# Patient Record
Sex: MALE | NOT HISPANIC OR LATINO | URBAN - METROPOLITAN AREA
[De-identification: names, ages, dates, MRNs, and addresses within clinical notes are randomized per-mention and may not be internally consistent; named-entity substitution may affect disease eponyms.]

---

## 2019-06-19 ENCOUNTER — OFFICE VISIT - HEALTHEAST (OUTPATIENT)
Dept: FAMILY MEDICINE | Facility: CLINIC | Age: 49
End: 2019-06-19

## 2019-06-19 DIAGNOSIS — R10.84 ABDOMINAL PAIN, GENERALIZED: ICD-10-CM

## 2019-06-19 DIAGNOSIS — R11.0 NAUSEA: ICD-10-CM

## 2019-06-19 DIAGNOSIS — K52.9 COLITIS: ICD-10-CM

## 2019-06-19 DIAGNOSIS — K62.5 RECTAL BLEEDING: ICD-10-CM

## 2019-06-19 LAB
BASOPHILS # BLD AUTO: 0.1 THOU/UL (ref 0–0.2)
BASOPHILS NFR BLD AUTO: 1 % (ref 0–2)
EOSINOPHIL # BLD AUTO: 0.3 THOU/UL (ref 0–0.4)
EOSINOPHIL NFR BLD AUTO: 3 % (ref 0–6)
ERYTHROCYTE [DISTWIDTH] IN BLOOD BY AUTOMATED COUNT: 10.3 % (ref 11–14.5)
HCT VFR BLD AUTO: 41.8 % (ref 40–54)
HGB BLD-MCNC: 14.2 G/DL (ref 14–18)
LYMPHOCYTES # BLD AUTO: 2.3 THOU/UL (ref 0.8–4.4)
LYMPHOCYTES NFR BLD AUTO: 26 % (ref 20–40)
MCH RBC QN AUTO: 33.6 PG (ref 27–34)
MCHC RBC AUTO-ENTMCNC: 34 G/DL (ref 32–36)
MCV RBC AUTO: 99 FL (ref 80–100)
MONOCYTES # BLD AUTO: 0.9 THOU/UL (ref 0–0.9)
MONOCYTES NFR BLD AUTO: 10 % (ref 2–10)
NEUTROPHILS # BLD AUTO: 5.6 THOU/UL (ref 2–7.7)
NEUTROPHILS NFR BLD AUTO: 61 % (ref 50–70)
PLATELET # BLD AUTO: 246 THOU/UL (ref 140–440)
PMV BLD AUTO: 7.3 FL (ref 7–10)
RBC # BLD AUTO: 4.24 MILL/UL (ref 4.4–6.2)
WBC: 9.1 THOU/UL (ref 4–11)

## 2019-06-19 RX ORDER — ONDANSETRON 4 MG/1
4 TABLET, FILM COATED ORAL EVERY 8 HOURS PRN
Qty: 15 TABLET | Refills: 0 | Status: SHIPPED | OUTPATIENT
Start: 2019-06-19

## 2019-06-19 RX ORDER — HYDROCODONE BITARTRATE AND ACETAMINOPHEN 5; 325 MG/1; MG/1
1 TABLET ORAL EVERY 4 HOURS PRN
Qty: 18 TABLET | Refills: 0 | Status: SHIPPED | OUTPATIENT
Start: 2019-06-19

## 2019-06-19 ASSESSMENT — MIFFLIN-ST. JEOR: SCORE: 1696.25

## 2019-06-20 ENCOUNTER — AMBULATORY - HEALTHEAST (OUTPATIENT)
Dept: LAB | Facility: CLINIC | Age: 49
End: 2019-06-20

## 2019-06-20 DIAGNOSIS — K62.5 RECTAL BLEEDING: ICD-10-CM

## 2019-06-20 DIAGNOSIS — K52.9 COLITIS: ICD-10-CM

## 2019-06-20 DIAGNOSIS — R10.84 ABDOMINAL PAIN, GENERALIZED: ICD-10-CM

## 2019-06-21 LAB
SHIGA TOXIN 1: NEGATIVE
SHIGA TOXIN 2: NEGATIVE

## 2019-06-23 ENCOUNTER — COMMUNICATION - HEALTHEAST (OUTPATIENT)
Dept: FAMILY MEDICINE | Facility: CLINIC | Age: 49
End: 2019-06-23

## 2019-06-23 LAB — BACTERIA SPEC CULT: NORMAL

## 2021-05-29 NOTE — TELEPHONE ENCOUNTER
Patient Returning Call  Reason for call:  Returning call  Information relayed to patient:  Relayed below information to patient.  Patient has additional questions:  No  If YES, what are your questions/concerns:  No additional questions at this time.  Okay to leave a detailed message?: No call back needed

## 2021-05-29 NOTE — TELEPHONE ENCOUNTER
----- Message from Jimi Epperson MD sent at 6/23/2019 12:17 PM CDT -----  Support staff to please contact patient and relay the following:  - Your stool culture tests are negative.

## 2021-06-03 VITALS — BODY MASS INDEX: 26.51 KG/M2 | WEIGHT: 185.19 LBS | HEIGHT: 70 IN

## 2021-06-17 NOTE — PATIENT INSTRUCTIONS - HE
Patient Instructions by Agnes Sánchez CNP at 6/19/2019  2:20 PM     Author: Agnes Sánchez CNP Service: -- Author Type: Nurse Practitioner    Filed: 6/19/2019  5:16 PM Encounter Date: 6/19/2019 Status: Signed    : Agnes Sánchez CNP (Nurse Practitioner)       Please return stool sample as soon as possible.  Return a sample that is not just blood if possible.    Please go to the hospital if you develop a fever, very frequent diarrhea, or pain is not controlled.    Ondansetron as needed for nausea.  Hydrocodone tablets 1 every 4 hours as needed.    Patient Education     When You Have Gastrointestinal (GI) Bleeding    Blood in your vomit or stool can be a sign of gastrointestinal (GI) bleeding. GI bleeding can be scary. But the cause may not be serious. You should always see a doctor if GI bleeding occurs.  The GI tract  The GI tract is the path through which food travels in the body. Food passes from the mouth down the esophagus (the tube from the mouth to the stomach). Food begins to break down in the stomach. It then moves through the duodenum, the first part of the small intestine. Nutrients are absorbed as food travels through the small intestine. What is left passes into the colon (large intestine) as waste. The colon removes water from the waste. Waste continues from the colon to the rectum (where stool is stored). Waste then leaves the body through the anus.  Causes of GI bleeding  GI bleeding can be caused by many different problems. Some of the more common causes include:    Swollen veins in the anus (hemorrhoids)    Swollen veins in the esophagus (varices)    Sore on the lining of the GI tract (ulcer)    Cuts or scrapes in the mouth or throat    Infection caused by germs such as bacteria or parasites    Food allergies, such as milk allergy in young children    Medicines    Inflammation of the GI tract (gastritis or esophagitis)    Colitis (Crohn's disease or ulcerative colitis)    Cancer  (tumors or polyps)    Abnormal pouches in the colon (diverticula)    Tears in the esophagus or anus    Nosebleed    Abnormal blood vessels in the GI tract (angiodysplasia)  Diagnosing the cause of blood in stool  If blood is coming out in your stool, you may have a lower GI tract problem or a very fast upper GI tract bleed. Bleeding from the GI tract can be bright red. Or it may look dark and tarry. Tests may also find blood in your stool that cant be seen with the eye (occult blood). To find out the cause, tests that may be ordered include:    Blood tests. A blood sample is taken and sent to a lab for exam.    Hemoccult test. Checks a stool sample for blood.    Stool culture. Checks a stool sample for bacteria or parasites.    X-ray, ultrasound, or CT scan. Imaging tests that take pictures of the digestive tract.    Colonoscopy or sigmoidoscopy. This test uses a flexible tube with a tiny camera. The tube is inserted through your anus into your rectum to see the inside of your colon. Your provider can also take a tiny tissue sample (biopsy) and treat a bleeding source  Diagnosing the cause of blood in vomit  If you are vomiting blood or something that looks like coffee grounds, you may have an upper GI tract problem. To find the cause, tests that may be done include:    Upper Endoscopy. A flexible tube with a tiny camera is inserted through your mouth and throat to see inside your upper GI tract. This lets your provider take a tiny tissue sample (biopsy) and treat a bleeding source.    Nasogastric lavage. This can tell if you have upper GI or lower GI bleeding.    X-ray, ultrasound, or CT scan. Imaging tests that take pictures of your digestive tract.    Upper GI series. X-rays of the upper part of your GI tract taken from inside your body.    Enteroscopy. This sends a flexible tube or a small, swallowed capsule camera into your small intestine.  When to call your healthcare provider  Call your healthcare provider  right away if you have any of the following:    Bleeding from your mouth or anus that can't be stopped    Fever of 100.4 F (38.0 ) or higher    Bleeding along with feeling lightheaded or dizzy    Signs of fluid loss (dehydration). These include a dry, sticky mouth, decreased urine output; and very dark urine.    Belly (abdominal) pain   Date Last Reviewed: 7/1/2016 2000-2017 [employer]. [address] [place] [address]. All rights reserved. This information is not intended as a substitute for professional medical care. Always follow your healthcare professional's instructions.

## 2021-06-19 NOTE — LETTER
Letter by Agnes Sánchez CNP at      Author: Agnes Sánchez CNP Service: -- Author Type: --    Filed:  Encounter Date: 6/19/2019 Status: (Other)         June 19, 2019     Patient: Wes Prado   YOB: 1970   Date of Visit: 6/19/2019       To Whom it May Concern:    Wes Prado was seen in my clinic on 6/19/2019.  Please excuse him from work on 6/20/2019 for contagious condition.      If you have any questions or concerns, please don't hesitate to call.    Sincerely,         Electronically signed by Agnes Sánchez CNP

## 2021-06-27 NOTE — PROGRESS NOTES
Progress Notes by Agnes Sánchez CNP at 6/19/2019  2:20 PM     Author: Agnes Sánchez CNP Service: -- Author Type: Nurse Practitioner    Filed: 6/21/2019 12:51 PM Encounter Date: 6/19/2019 Status: Signed    : Agnes Sánchez CNP (Nurse Practitioner)       Chief Complaint   Patient presents with   ? Abdominal Pain     x2 day, blood whenever there is a bowel movement per pt        ASSESSMENT & PLAN:   Diagnoses and all orders for this visit:    Rectal bleeding  -     HM1(CBC and Differential)  -     HM1 (CBC with Diff)  -     CT Abdomen Pelvis Without Oral With IV Contrast  -     Culture, Stool; Future; Expected date: 06/20/2019    Abdominal pain, generalized  -     CT Abdomen Pelvis Without Oral With IV Contrast  -     ketorolac injection 30 mg (TORADOL)  -     Culture, Stool; Future; Expected date: 06/20/2019  -     HYDROcodone-acetaminophen 5-325 mg per tablet; Take 1 tablet by mouth every 4 (four) hours as needed for pain.  Dispense: 18 tablet; Refill: 0    Nausea  -     ondansetron disintegrating tablet 4 mg (ZOFRAN-ODT)  -     ondansetron (ZOFRAN) 4 MG tablet; Take 1 tablet (4 mg total) by mouth every 8 (eight) hours as needed for nausea.  Dispense: 15 tablet; Refill: 0    Colitis  -     Culture, Stool; Future; Expected date: 06/20/2019        MDM:  Concern for infectious colitis as a source of pain, for instance, E. coli 0157, Campylobacter, Shigella.  Discussed this with patient.  Patient to return with stool sample.  If he does have a positive stool culture, antibiotics based on organism found.  Symptomatic treatment.  Patient to go to hospital with severely worsening abdominal pain, increase in bleeding, dizziness, weakness.  Clear liquids, ADAT.    Supportive care discussed.  See discharge instructions below for specific recommendations given.    At the end of the encounter, I discussed results, diagnosis, medications. Discussed red flags for immediate return to clinic/ER, as well as  "indications for follow up if no improvement. Patient and/or caregiver understood and agreed to plan. Patient was stable for discharge.    SUBJECTIVE    HPI:  HPI  Wes Prado presents to the walk-in clinic with generalized abdominal fullness and increased bowel \"rumbling\".  Pain rated 4/5.  LBM diarrhea this morning otherwise has been having a few tsp blood only per rectum.      No rectal pain.  No history of any inflammatory bowel disease such as ulcerative colitis or Crohn's disease.    History obtained from the patient.    No past medical history on file.    There are no active non-hospital problems to display for this patient.        Social History     Tobacco Use   ? Smoking status: Never Smoker   ? Smokeless tobacco: Never Used   Substance Use Topics   ? Alcohol use: Not on file       Review of Systems   Constitutional: Positive for chills and fever.   Gastrointestinal: Positive for abdominal distention, abdominal pain, anal bleeding, blood in stool and diarrhea. Negative for constipation.   Musculoskeletal: Positive for back pain.   Skin: Negative for rash.       OBJECTIVE    Vitals:    06/19/19 1425   BP: 115/79   Patient Site: Right Arm   Patient Position: Sitting   Cuff Size: Adult Regular   Pulse: 86   Resp: 16   Temp: 98.5  F (36.9  C)   TempSrc: Oral   SpO2: 97%   Weight: 185 lb 3 oz (84 kg)   Height: 5' 10\" (1.778 m)       Physical Exam   Constitutional: He is oriented to person, place, and time. He appears well-developed and well-nourished. No distress.   HENT:   Right Ear: External ear normal.   Left Ear: External ear normal.   Eyes: Conjunctivae are normal. Right eye exhibits no discharge. Left eye exhibits no discharge.   Cardiovascular: Normal rate, regular rhythm, normal heart sounds and intact distal pulses.   Pulmonary/Chest: Effort normal.   Abdominal: Soft. He exhibits distension. There is no tenderness. There is no guarding.   Genitourinary: Rectal exam shows external hemorrhoid. "   Musculoskeletal: Normal range of motion.   Neurological: He is alert and oriented to person, place, and time.   Skin: Skin is warm and dry. Capillary refill takes less than 2 seconds.   Psychiatric: He has a normal mood and affect. His behavior is normal. Judgment and thought content normal.       Labs:  Recent Results (from the past 240 hour(s))   HM1 (CBC with Diff)   Result Value Ref Range    WBC 9.1 4.0 - 11.0 thou/uL    RBC 4.24 (L) 4.40 - 6.20 mill/uL    Hemoglobin 14.2 14.0 - 18.0 g/dL    Hematocrit 41.8 40.0 - 54.0 %    MCV 99 80 - 100 fL    MCH 33.6 27.0 - 34.0 pg    MCHC 34.0 32.0 - 36.0 g/dL    RDW 10.3 (L) 11.0 - 14.5 %    Platelets 246 140 - 440 thou/uL    MPV 7.3 7.0 - 10.0 fL    Neutrophils % 61 50 - 70 %    Lymphocytes % 26 20 - 40 %    Monocytes % 10 2 - 10 %    Eosinophils % 3 0 - 6 %    Basophils % 1 0 - 2 %    Neutrophils Absolute 5.6 2.0 - 7.7 thou/uL    Lymphocytes Absolute 2.3 0.8 - 4.4 thou/uL    Monocytes Absolute 0.9 0.0 - 0.9 thou/uL    Eosinophils Absolute 0.3 0.0 - 0.4 thou/uL    Basophils Absolute 0.1 0.0 - 0.2 thou/uL         Radiology:    Ct Abdomen Pelvis Without Oral With Iv Contrast    Result Date: 6/19/2019  EXAM DATE:         06/19/2019 EXAM: CT ABDOMEN, PELVIS WITH CONTRAST LOCATION: [facility] DATE/TIME: 6/19/2019 3:30 PM INDICATION: Hemorrhage of anus and rectum. Generalized abdominal pain COMPARISON: None. TECHNIQUE: Helical enhanced thin-section CT scan of the abdomen and pelvis was performed following injection of IV contrast. Multiplanar reformats were obtained. Dose reduction techniques were used. CONTRAST: 100ml of [id number] was administered with 0ml discarded. FINDINGS: LUNG BASES: Minimal scarring or atelectasis right middle lobe. ABDOMEN: Mild diffuse fatty infiltration of the liver. 8 mm hypodense lesion right hepatic lobe on image 32, too small to further characterize, but may represent a small cyst or unopacified vessel. The spleen, pancreas,  adrenal glands, gallbladder, and kidneys are normal. No adenopathy. PELVIS: Moderate diffuse wall thickening extending from the splenic flexure to the mid sigmoid colon with adjacent stranding. Scattered diverticula more distally within the sigmoid colon. Trace free pelvic fluid. No evidence for perforation, abscess, or bowel obstruction. Normal appendix and terminal ileum. MUSCULOSKELETAL: Negative. CONCLUSION: 1.  Wall thickening and inflammatory changes involving the descending and proximal sigmoid colon consistent with an acute colitis, likely infectious or inflammatory. 2.  Scattered diverticula more distally within the sigmoid colon. 3.  Small amount of free pelvic fluid. 4.  Small hypodense liver lesion of doubtful clinical significance. DICOM format image data is available to non-affiliated external healthcare facilities or entities on a secure, media-free, reciprocally searchable basis with patient authorization for at least a 12-month period after the study.       PATIENT INSTRUCTIONS:   Patient Instructions     Please return stool sample as soon as possible.  Return a sample that is not just blood if possible.    Please go to the hospital if you develop a fever, very frequent diarrhea, or pain is not controlled.    Ondansetron as needed for nausea.  Hydrocodone tablets 1 every 4 hours as needed.    Patient Education     When You Have Gastrointestinal (GI) Bleeding    Blood in your vomit or stool can be a sign of gastrointestinal (GI) bleeding. GI bleeding can be scary. But the cause may not be serious. You should always see a doctor if GI bleeding occurs.  The GI tract  The GI tract is the path through which food travels in the body. Food passes from the mouth down the esophagus (the tube from the mouth to the stomach). Food begins to break down in the stomach. It then moves through the duodenum, the first part of the small intestine. Nutrients are absorbed as food travels through the small intestine.  What is left passes into the colon (large intestine) as waste. The colon removes water from the waste. Waste continues from the colon to the rectum (where stool is stored). Waste then leaves the body through the anus.  Causes of GI bleeding  GI bleeding can be caused by many different problems. Some of the more common causes include:    Swollen veins in the anus (hemorrhoids)    Swollen veins in the esophagus (varices)    Sore on the lining of the GI tract (ulcer)    Cuts or scrapes in the mouth or throat    Infection caused by germs such as bacteria or parasites    Food allergies, such as milk allergy in young children    Medicines    Inflammation of the GI tract (gastritis or esophagitis)    Colitis (Crohn's disease or ulcerative colitis)    Cancer (tumors or polyps)    Abnormal pouches in the colon (diverticula)    Tears in the esophagus or anus    Nosebleed    Abnormal blood vessels in the GI tract (angiodysplasia)  Diagnosing the cause of blood in stool  If blood is coming out in your stool, you may have a lower GI tract problem or a very fast upper GI tract bleed. Bleeding from the GI tract can be bright red. Or it may look dark and tarry. Tests may also find blood in your stool that cant be seen with the eye (occult blood). To find out the cause, tests that may be ordered include:    Blood tests. A blood sample is taken and sent to a lab for exam.    Hemoccult test. Checks a stool sample for blood.    Stool culture. Checks a stool sample for bacteria or parasites.    X-ray, ultrasound, or CT scan. Imaging tests that take pictures of the digestive tract.    Colonoscopy or sigmoidoscopy. This test uses a flexible tube with a tiny camera. The tube is inserted through your anus into your rectum to see the inside of your colon. Your provider can also take a tiny tissue sample (biopsy) and treat a bleeding source  Diagnosing the cause of blood in vomit  If you are vomiting blood or something that looks like coffee  grounds, you may have an upper GI tract problem. To find the cause, tests that may be done include:    Upper Endoscopy. A flexible tube with a tiny camera is inserted through your mouth and throat to see inside your upper GI tract. This lets your provider take a tiny tissue sample (biopsy) and treat a bleeding source.    Nasogastric lavage. This can tell if you have upper GI or lower GI bleeding.    X-ray, ultrasound, or CT scan. Imaging tests that take pictures of your digestive tract.    Upper GI series. X-rays of the upper part of your GI tract taken from inside your body.    Enteroscopy. This sends a flexible tube or a small, swallowed capsule camera into your small intestine.  When to call your healthcare provider  Call your healthcare provider right away if you have any of the following:    Bleeding from your mouth or anus that can't be stopped    Fever of 100.4 F (38.0 ) or higher    Bleeding along with feeling lightheaded or dizzy    Signs of fluid loss (dehydration). These include a dry, sticky mouth, decreased urine output; and very dark urine.    Belly (abdominal) pain   Date Last Reviewed: 7/1/2016 2000-2017 [employer]. [address] [place] [address], [place] [address]. All rights reserved. This information is not intended as a substitute for professional medical care. Always follow your healthcare professional's instructions.